# Patient Record
Sex: MALE
[De-identification: names, ages, dates, MRNs, and addresses within clinical notes are randomized per-mention and may not be internally consistent; named-entity substitution may affect disease eponyms.]

---

## 2021-09-30 ENCOUNTER — HOSPITAL ENCOUNTER (EMERGENCY)
Dept: HOSPITAL 97 - ER | Age: 36
Discharge: HOME | End: 2021-09-30
Payer: SELF-PAY

## 2021-09-30 VITALS — SYSTOLIC BLOOD PRESSURE: 116 MMHG | DIASTOLIC BLOOD PRESSURE: 78 MMHG | TEMPERATURE: 96.4 F

## 2021-09-30 VITALS — OXYGEN SATURATION: 100 %
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
## 2021-09-30 DIAGNOSIS — Z23: ICD-10-CM

## 2021-09-30 DIAGNOSIS — S51.811A: Primary | ICD-10-CM
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
## 2021-09-30 PROCEDURE — 90471 IMMUNIZATION ADMIN: CPT

## 2021-09-30 PROCEDURE — 0JQG0ZZ REPAIR RIGHT LOWER ARM SUBCUTANEOUS TISSUE AND FASCIA, OPEN APPROACH: ICD-10-PCS

## 2021-09-30 PROCEDURE — 90714 TD VACC NO PRESV 7 YRS+ IM: CPT

## 2021-09-30 PROCEDURE — 99284 EMERGENCY DEPT VISIT MOD MDM: CPT

## 2021-09-30 NOTE — EDPHYS
Physician Documentation                                                                           

 [facility]                                                                 

Name: Evin Diez                                                                  

Age: 36 yrs                                                                                       

Sex: Male                                                                                         

: 1985                                                                                   

MRN: [id number]                                                                                   

Arrival Date: 2021                                                                          

Time: 17:28                                                                                       

Account#: [id number]                                                                            

Bed 12                                                                                            

Private MD:                                                                                       

ED Physician Donavon Galindo                                                                         

HPI:                                                                                              

                                                                                             

17:35 This 36 yrs old  Male presents to ER via EMS with complaints of Laceration To   cp  

      Arm.                                                                                        

17:35 The patient has a laceration occurred at work, and using electrical saw. The            cp  

      laceration(s) is(are) located on the right forearm. Onset: The symptoms/episode             

      began/occurred just prior to arrival. Associated signs and symptoms: Pertinent              

      positives: heavy bleeding, Pertinent negatives: numbness distal to injury.                  

                                                                                                  

Historical:                                                                                       

- Allergies:                                                                                      

17:34 No Known Allergies;                                                                     oh  

- PMHx:                                                                                           

17:34 None;                                                                                   oh  

                                                                                                  

- Immunization history:: Adult Immunizations up to date, Client reports having NOT                

  received the Covid vaccine.                                                                     

- Social history:: Smoking status: Patient denies any tobacco usage or history of.                

                                                                                                  

                                                                                                  

ROS:                                                                                              

17:40 Skin: Positive for laceration(s), of the right forearm.                                 cp  

17:40 Constitutional: Negative for body aches, chills, fever.                                 cp  

17:40 Cardiovascular: Negative for chest pain.                                                    

17:40 Respiratory: Negative for cough, shortness of breath, wheezing.                             

17:40 Abdomen/GI: Negative for abdominal pain, nausea, vomiting, and diarrhea.                    

17:40 Neuro: Negative for numbness, weakness.                                                     

17:40 All other systems are negative.                                                             

                                                                                                  

Exam:                                                                                             

17:45 Constitutional: The patient appears in no acute distress, alert, awake, comfortable,    cp  

      non-toxic, well developed, well nourished.                                                  

17:45 Head/Face:  Normocephalic, atraumatic.                                                  cp  

17:45 Cardiovascular: Rate: normal.                                                               

17:45 Respiratory: the patient does not display signs of respiratory distress,  Respirations:     

      normal.                                                                                     

17:45 Abdomen/GI: Exam negative for discomfort, distension, guarding, Inspection: abdomen         

      appears normal.                                                                             

17:45 Musculoskeletal/extremity: Pulses: noted to be 2+ in the right radial artery, Tendon        

      exam: specific tendon testing normal through active and passive range of motion He has      

      full range of motion of right hand, able to extend and give thumbs up sign on the right     

      hand.  No gross numbness noted to right hand or right forearm..                             

17:45 Skin: injury, laceration(s), the wound is approximately  8 cm(s), of the volar side         

      right forearm, that can be described as no foreign body, linear, with mild bleeding.        

                                                                                                  

Vital Signs:                                                                                      

17:29  / 93; Pulse 63; Resp 17; Temp 97.3; Pulse Ox 100% on R/A; Weight 81.65 kg;       oh  

      Height 5 ft. 7 in. (170.18 cm);                                                             

19:15  / 78; Pulse 77; Resp 20; Temp 96.4; Pulse Ox 100% on R/A;                        cc4 

17:29 Body Mass Index 28.19 (81.65 kg, 170.18 cm)                                             oh  

                                                                                                  

Laceration:                                                                                       

19:00 Wound Repair of 8cm ( 3.1in ) subcutaneous laceration to right forearm. Linear shaped.. cp  

      Distal neuro/vascular/tendon intact. Anesthesia: Wound infiltrated with 9 mls of            

      Lido/Marcaine. Wound prep: Moderate cleansing by me, Wound irrigation by me. Skin           

      closed with 9 4-0 Prolene using interrupted sutures and sterile technique. Dressed with     

      Bacitracin, 4x4's. Patient tolerated well.                                                  

                                                                                                  

MDM:                                                                                              

17:31 Patient medically screened.                                                               

19:00 Data reviewed: vital signs, nurses notes, radiologic studies, plain films.                

19:00 Test interpretation: by ED physician or midlevel provider: xrays of right forearm       cp  

      negative for fracture. Counseling: I had a detailed discussion with the patient and/or      

      guardian regarding: the historical points, exam findings, and any diagnostic results        

      supporting the discharge/admit diagnosis, radiology results, the need for outpatient        

      follow up, a family practitioner, to return to the emergency department if symptoms         

      worsen or persist or if there are any questions or concerns that arise at home.             

      Response to treatment: the patient's symptoms have markedly improved after treatment.       

                                                                                                  

                                                                                             

17:30 Order name: XRAY Forearm RIGHT; Complete Time: 18:15                                      

                                                                                             

18:15 Interpretation: Reviewed.                                                                 

                                                                                             

17:30 Order name: Dressing - Wound                                                              

                                                                                             

17:30 Order name: Gloves, Sterile; Complete Time: 18:05                                         

                                                                                             

17:30 Order name: Setup Suture Tray; Complete Time: 17:36                                       

                                                                                             

18:57 Order name: Wound dressing                                                              cp  

                                                                                                  

Administered Medications:                                                                         

17:45 Drug: Tetanus-Diphtheria Toxoid Adult 0.5 ml {: Cardiio. Exp:         oh  

      2023. Lot #: [id number]. } Route: IM; Site: left deltoid;                                  

19:15 Follow up: Response: No adverse reaction                                                cc4 

18:23 Drug: Lidocaine-Epinephrine -1%: (1:100,000) 10 ml {Note: administered by CAROL Olivares.}     oh  

      Volume: 20 ml; Route: Infiltration;                                                         

18:24 Drug: Marcaine (bupivacaine) (0.5 %) 10 ml {Note: administered by CAROL Olivares.} Volume: 10  oh  

      ml; Route: Infiltration;                                                                    

19:15 Follow up: Response: No adverse reaction; Marked relief of symptoms                     cc4 

                                                                                                  

                                                                                                  

Disposition:                                                                                      

19:10 Chart complete.                                                                         cp  

10/01                                                                                             

07:55 Co-signature as Attending Physician, Donavon Galindo MD I agree with the assessment and     rn  

      plan of care. Attestation: The patient's history, exam findings, diagnostics, and a         

      summary of any interventions or procedures was reviewed in detail with Gentry MEDINA.       

                                                                                                  

Disposition Summary:                                                                              

21 19:01                                                                                    

Discharge Ordered                                                                                 

      Location: Home                                                                          cp  

      Problem: new                                                                            cp  

      Symptoms: have improved                                                                 cp  

      Condition: Stable                                                                       cp  

      Diagnosis                                                                                   

        - Laceration without foreign body of right forearm                                    cp  

      Followup:                                                                               cp  

        - With: Private Physician                                                                  

        - When: 10 - 14 days                                                                       

        - Reason: Staple/Suture removal                                                            

      Discharge Instructions:                                                                     

        - Discharge Summary Sheet                                                             cp  

        - Laceration Care, Adult                                                              cp  

      Forms:                                                                                      

        - Medication Reconciliation Form                                                      cp  

        - Thank You Letter                                                                    cp  

        - Antibiotic Education                                                                cp  

        - Prescription Opioid Use                                                             cp  

      Prescriptions:                                                                              

        - Cephalexin 500 mg Oral Capsule                                                           

            - take 1 capsule by ORAL route every 8 hours for 10 days; 30 capsule; Refills: 0, cp  

      Product Selection Permitted                                                                 

        - Ibuprofen 800 mg Oral Tablet                                                             

            - take 1 tablet by ORAL route every 8 hours As needed take with food; 30 tablet;  cp  

      Refills: 0, Product Selection Permitted                                                     

Signatures:                                                                                       

Dispatcher [employer]                           Donavon Jain MD MD rn Page, Corey, PA PA cp Harriott, Oneka RN                     Piper Warren RN   cc4                                                  

                                                                                                  

**************************************************************************************************

## 2021-09-30 NOTE — RAD REPORT
EXAM DESCRIPTION:  RAD - Forearm Right - 9/30/2021 5:56 pm

 

CLINICAL HISTORY:  Right arm pain

 

FINDINGS:  No fracture is seen. Laceration anterior soft tissue distal forearm. A radiopaque foreign 
body is not seen

## 2021-09-30 NOTE — ER
Nurse's Notes                                                                                     

 [facility]                                                                 

Name: Evin Diez                                                                  

Age: 36 yrs                                                                                       

Sex: Male                                                                                         

: 1985                                                                                   

MRN: [id number]                                                                                   

Arrival Date: 2021                                                                          

Time: 17:28                                                                                       

Account#: [id number]                                                                            

Bed 12                                                                                            

Private MD:                                                                                       

Diagnosis: Laceration without foreign body of right forearm                                       

                                                                                                  

Presentation:                                                                                     

                                                                                             

17:29 Chief complaint: Patient states: Deep Laceration to right arm while at work.            oh  

      Coronavirus screen: Vaccine status: Patient reports being unvaccinated. Ebola Screen:       

      No symptoms or risks identified at this time. Complicating Factors: cut with granite        

      stone. Initial Sepsis Screen: Does the patient meet any 2 criteria? No. Patient's           

      initial sepsis screen is negative. Does the patient have a suspected source of              

      infection? No. Patient's initial sepsis screen is negative. Risk Assessment: Do you         

      want to hurt yourself or someone else? Patient reports no desire to harm self or others.    

17:29 Method Of Arrival: EMS: [facility]                                                oh  

17:29 Acuity: DONOVAN 4                                                                           oh  

18:11 Onset of symptoms was 2021.                                               oh  

                                                                                                  

Triage Assessment:                                                                                

17:33 General: Appears uncomfortable, Behavior is calm, cooperative, appropriate for age,     oh  

      Reports laceration to right arm. Injury Description: Laceration sustained to right arm      

      is bleeding profusely a dressing was applied.                                               

                                                                                                  

Historical:                                                                                       

- Allergies:                                                                                      

17:34 No Known Allergies;                                                                     oh  

- PMHx:                                                                                           

17:34 None;                                                                                   oh  

                                                                                                  

- Immunization history:: Adult Immunizations up to date, Client reports having NOT                

  received the Covid vaccine.                                                                     

- Social history:: Smoking status: Patient denies any tobacco usage or history of.                

                                                                                                  

                                                                                                  

Screenin:35 Abuse screen: Denies threats or abuse. Nutritional screening: No deficits noted.        oh  

      Tuberculosis screening: No symptoms or risk factors identified. Fall Risk None              

      identified.                                                                                 

                                                                                                  

Assessment:                                                                                       

18:10 Musculoskeletal: Reports pain in right arm.                                             oh  

18:11 Injury Description: Laceration is bleeding moderately.                                  oh  

18:11 Reassessment:.                                                                          oh  

                                                                                                  

Vital Signs:                                                                                      

17:29  / 93; Pulse 63; Resp 17; Temp 97.3; Pulse Ox 100% on R/A; Weight 81.65 kg;       oh  

      Height 5 ft. 7 in. (170.18 cm);                                                             

19:15  / 78; Pulse 77; Resp 20; Temp 96.4; Pulse Ox 100% on R/A;                        cc4 

17:29 Body Mass Index 28.19 (81.65 kg, 170.18 cm)                                             oh  

                                                                                                  

ED Course:                                                                                        

17:28 Patient arrived in ED.                                                                  oh  

17:28 Suzi Mrecado, RN is Primary Nurse.                                                   oh  

17:29 Gentry Olivares PA is PHCP.                                                                cp  

17:29 Donavon Galindo MD is Attending Physician.                                                cp  

17:33 Triage completed.                                                                       oh  

17:33 Pressure dressing applied.                                                              oh  

17:56 XRAY Forearm RIGHT In Process Unspecified.                                              EDMS

18:09 Arm band placed on right wrist.                                                         oh  

18:09 Bed in low position. Call light in reach.                                               oh  

18:10 Maintain EMS IV. Dressing intact. Good blood return noted. Site clean \T\ dry.            oh

19:00 Assist provider with laceration repair on right arm.                                    cc4 

19:15 IV discontinued, intact, bleeding controlled, No redness/swelling at site. Pressure     cc4 

      dressing applied.                                                                           

                                                                                                  

Administered Medications:                                                                         

17:45 Drug: Tetanus-Diphtheria Toxoid Adult 0.5 ml {: [employer]. Exp:         oh  

      2023. Lot #: [id number]. } Route: IM; Site: left deltoid;                                  

19:15 Follow up: Response: No adverse reaction                                                cc4 

18:23 Drug: Lidocaine-Epinephrine -1%: (1:100,000) 10 ml {Note: administered by CAROL Olivares.}     oh  

      Volume: 20 ml; Route: Infiltration;                                                         

18:24 Drug: Marcaine (bupivacaine) (0.5 %) 10 ml {Note: administered by CAROL Olivares.} Volume: 10  oh  

      ml; Route: Infiltration;                                                                    

19:15 Follow up: Response: No adverse reaction; Marked relief of symptoms                     cc4 

                                                                                                  

                                                                                                  

Outcome:                                                                                          

19:01 Discharge ordered by MD.                                                                cp  

19:15 Discharged to home ambulatory, with significant other.                                  cc4 

19:15 Condition: good                                                                             

19:15 Discharge instructions given to patient, significant other, Instructed on discharge         

      instructions, follow up and referral plans. medication usage, wound care, Demonstrated      

      understanding of instructions, follow-up care, medications, wound care.                     

19:36 Patient left the ED.                                                                    cc4 

                                                                                                  

Signatures:                                                                                       

Dispatcher [employer]                           EDMS                                                 

Gentry Olivares PA PA cp Cooper, Christie, RN                    RN   cc4                                                  

Rogelio, Oneka, RN                     RN   oh                                                   

                                                                                                  

**************************************************************************************************